# Patient Record
Sex: MALE | Race: OTHER | Employment: FULL TIME | URBAN - METROPOLITAN AREA
[De-identification: names, ages, dates, MRNs, and addresses within clinical notes are randomized per-mention and may not be internally consistent; named-entity substitution may affect disease eponyms.]

---

## 2021-04-10 ENCOUNTER — HOSPITAL ENCOUNTER (EMERGENCY)
Facility: HOSPITAL | Age: 41
Discharge: HOME | End: 2021-04-10
Attending: EMERGENCY MEDICINE
Payer: COMMERCIAL

## 2021-04-10 VITALS
TEMPERATURE: 98 F | RESPIRATION RATE: 20 BRPM | SYSTOLIC BLOOD PRESSURE: 157 MMHG | HEIGHT: 72 IN | BODY MASS INDEX: 32.51 KG/M2 | OXYGEN SATURATION: 99 % | DIASTOLIC BLOOD PRESSURE: 94 MMHG | HEART RATE: 77 BPM | WEIGHT: 240 LBS

## 2021-04-10 DIAGNOSIS — S61.213A LACERATION OF LEFT MIDDLE FINGER WITHOUT FOREIGN BODY WITHOUT DAMAGE TO NAIL, INITIAL ENCOUNTER: Primary | ICD-10-CM

## 2021-04-10 PROCEDURE — 90471 IMMUNIZATION ADMIN: CPT

## 2021-04-10 PROCEDURE — 99283 EMERGENCY DEPT VISIT LOW MDM: CPT

## 2021-04-10 PROCEDURE — 12002 RPR S/N/AX/GEN/TRNK2.6-7.5CM: CPT

## 2021-04-10 NOTE — ED PROVIDER NOTES
Patient Seen in: [facility] AND [facility] Emergency Department    History   Patient presents with:  Laceration/Abrasion    Stated Complaint: LAC TO FINGERS    HPI    Patient is here for complaint of laceration to his finger.   He ended up cutting this when he wa nerve asymmetry noted. Psychiatric:  Normal affect. Oriented. No unusual behavior. Interacting well. Wound care had been done. I recommended closure. We will do local anesthetic and then repair    Patient had wound care.   I did anesthetize the are

## 2021-04-10 NOTE — ED INITIAL ASSESSMENT (HPI)
Patient presents to ER with laceration to left middle finger. Patient admits he was cutting corn when he sliced the tip of his finger. Significant laceration, laceration still bleeding. Wrapped with gauze and coband in triage.   CMS in tact

## 2021-04-20 ENCOUNTER — PATIENT OUTREACH (OUTPATIENT)
Dept: CASE MANAGEMENT | Age: 41
End: 2021-04-20

## 2021-04-20 NOTE — PROGRESS NOTES
Patient called requesting assistance in sched ER fup      Jewell Client MD [address] [place] [phone]      stiches out in 8-10 days    Appt on 4/22 @1:30pm

## 2025-03-09 ENCOUNTER — APPOINTMENT (OUTPATIENT)
Dept: ULTRASOUND IMAGING | Facility: HOSPITAL | Age: 45
End: 2025-03-09
Attending: PHYSICIAN ASSISTANT
Payer: COMMERCIAL

## 2025-03-09 ENCOUNTER — HOSPITAL ENCOUNTER (OUTPATIENT)
Age: 45
Discharge: HOME | End: 2025-03-09
Payer: COMMERCIAL

## 2025-03-09 VITALS
RESPIRATION RATE: 16 BRPM | SYSTOLIC BLOOD PRESSURE: 126 MMHG | DIASTOLIC BLOOD PRESSURE: 82 MMHG | OXYGEN SATURATION: 100 % | TEMPERATURE: 98 F | HEART RATE: 84 BPM

## 2025-03-09 DIAGNOSIS — M79.605 LEFT LEG PAIN: Primary | ICD-10-CM

## 2025-03-09 PROCEDURE — 99203 OFFICE O/P NEW LOW 30 MIN: CPT | Performed by: PHYSICIAN ASSISTANT

## 2025-03-09 PROCEDURE — 93971 EXTREMITY STUDY: CPT | Performed by: PHYSICIAN ASSISTANT

## 2025-03-09 NOTE — ED INITIAL ASSESSMENT (HPI)
Patient is here with pain in his left upper thigh for the last three weeks.  He has had xrays and was seen by an orthopedic doctor.  He states he even had massages in the area.  He states the pain is only when he first gets up in the morning or sits for a little while.

## 2025-03-09 NOTE — ED PROVIDER NOTES
Patient Seen in: [facility]      History   No chief complaint on file.    Stated Complaint: Leg pain    Subjective:   HPI      43 y/o M with intermittent left leg pain for the past several weeks. Pt reports a tightness/discomfort in the back of his leg worse with position changes and certain times of the day. States after walking/using the leg the pain resolves.     No F/C/N/V, lower back pain, extremity numbness/tingling, groin numbness/tingling, overlying skin changes.     Not a smoker.  No personal or family history of bleeding or clotting disorders, recent surgery or travel, personal or family history of DVT or PE, history of cancer.  No history of IV drug use.    Pt has had a massage, heat pad with mild relief.     Objective:     History reviewed. No pertinent past medical history.           History reviewed. No pertinent surgical history.             Social History     Socioeconomic History    Marital status:    Tobacco Use    Smoking status: Never    Smokeless tobacco: Never   Vaping Use    Vaping status: Never Used   Substance and Sexual Activity    Alcohol use: Not Currently    Drug use: Not Currently              Review of Systems    Positive for stated complaint: Leg pain  Other systems are as noted in HPI.  Constitutional and vital signs reviewed.      All other systems reviewed and negative except as noted above.    Physical Exam     ED Triage Vitals [03/09/25 0913]   /82   Pulse 84   Resp 16   Temp 98.3 °F (36.8 °C)   Temp src Oral   SpO2 100 %   O2 Device None (Room air)       Current Vitals:   Vital Signs  BP: 126/82  Pulse: 84  Resp: 16  Temp: 98.3 °F (36.8 °C)  Temp src: Oral    Oxygen Therapy  SpO2: 100 %  O2 Device: None (Room air)        Physical Exam  Vitals and nursing note reviewed.   Constitutional:       Appearance: Normal appearance.   HENT:      Head: Normocephalic.      Nose: Nose normal.   Eyes:      Conjunctiva/sclera: Conjunctivae normal.   Cardiovascular:       Rate and Rhythm: Normal rate.   Pulmonary:      Effort: Pulmonary effort is normal. No respiratory distress.   Musculoskeletal:         General: Normal range of motion.      Cervical back: Normal range of motion.      Comments: No lumbar midline tenderness. Mild generalized ttp to the left hamstring. No overlying skin changes. No ttp over the left calf. No M/S deficit in the LLE. All distal pulses intact. Unable to fully extend the left leg 2/2 hamstring tension    Skin:     General: Skin is warm.   Neurological:      General: No focal deficit present.      Mental Status: He is alert.   Psychiatric:         Mood and Affect: Mood normal.         Behavior: Behavior normal.           ED Course   Labs Reviewed - No data to display       US VENOUS DOPPLER LEG LEFT - DIAG IMG (CPT=93971)   Final Result   PROCEDURE: US VENOUS DOPPLER LEG LEFT-DIAG IMG (CPT=93971)       COMPARISON: None.       INDICATIONS: Left leg pain       TECHNIQUE: Color duplex Doppler venous ultrasound of the left lower    extremity was performed in the usual manner.       FINDINGS: The femoral and popliteal veins appear normal.  Normal flow was    demonstrated with color and pulsed Doppler.  Visualized portions of the    great and small saphenous, posterior tibial, and peroneal veins appear    normal.         THROMBI: None visible.   COMPRESSIBILITY: Normal.   OTHER: Negative.                   =====   CONCLUSION: No left lower extremity DVT.               Dictated by (CST): Scottie Soler MD on 3/09/2025 at 10:51 AM        Finalized by (CST): Scottie Soler MD on 3/09/2025 at 10:52 AM                         MDM           Medical Decision Making  43 y/o M with atraumatic left leg pain.   Differential muscle strain/spasm vs radicular pain vs DVT vs shingles   VSS. No systemic sx. No gross neuro deficits.     Ultrasound unremarkable without DVT. Suspect tight/strained hamstrings.   Supportive care. Ibuprofen. Heat, stretching/foam roller.  Close PCP follow up and ER precautions advised.   Discussed case with Dr. Cox. Agreeable with management and dispo.      Disposition and Plan     Clinical Impression:  1. Left leg pain         Disposition:  Discharge  3/9/2025 10:59 am    Follow-up:  Derek Lopez MD  [address] [place] [id number] [phone]    In 2 days      [facility] [address] [address]  [phone]              Medications Prescribed:  There are no discharge medications for this patient.          Supplementary Documentation:

## 2025-03-09 NOTE — ED QUICK NOTES
Patient was discharged to [facility] outpatient US.  He went by private vehicle by himself.

## 2025-03-09 NOTE — DISCHARGE INSTRUCTIONS
Follow up with primary care provider for further evaluation and management of your symptoms. If symptoms persist despite supportive measures we discussed today you may need physical therapy and/or further imaging/workup